# Patient Record
Sex: MALE | Race: AMERICAN INDIAN OR ALASKA NATIVE | NOT HISPANIC OR LATINO | Employment: UNEMPLOYED | URBAN - METROPOLITAN AREA
[De-identification: names, ages, dates, MRNs, and addresses within clinical notes are randomized per-mention and may not be internally consistent; named-entity substitution may affect disease eponyms.]

---

## 2018-07-17 RX ORDER — CLOBETASOL PROPIONATE 0.5 MG/G
CREAM TOPICAL
Refills: 0 | COMMUNITY
Start: 2018-06-11

## 2018-07-17 RX ORDER — SIMVASTATIN 20 MG
20 TABLET ORAL DAILY
Refills: 0 | COMMUNITY
Start: 2018-06-09

## 2018-07-17 RX ORDER — TIMOLOL MALEATE 5 MG/ML
SOLUTION OPHTHALMIC
Refills: 0 | COMMUNITY
Start: 2018-06-21

## 2018-08-10 ENCOUNTER — OFFICE VISIT (OUTPATIENT)
Dept: CARDIOLOGY CLINIC | Facility: CLINIC | Age: 58
End: 2018-08-10
Payer: COMMERCIAL

## 2018-08-10 VITALS
BODY MASS INDEX: 27.22 KG/M2 | SYSTOLIC BLOOD PRESSURE: 126 MMHG | WEIGHT: 183.8 LBS | DIASTOLIC BLOOD PRESSURE: 86 MMHG | HEART RATE: 55 BPM | OXYGEN SATURATION: 100 % | HEIGHT: 69 IN

## 2018-08-10 DIAGNOSIS — E78.5 HYPERLIPIDEMIA, UNSPECIFIED HYPERLIPIDEMIA TYPE: ICD-10-CM

## 2018-08-10 DIAGNOSIS — Z82.49 FAMILY HISTORY OF CORONARY ARTERY DISEASE IN MOTHER: Primary | ICD-10-CM

## 2018-08-10 PROCEDURE — 99202 OFFICE O/P NEW SF 15 MIN: CPT | Performed by: INTERNAL MEDICINE

## 2018-08-10 PROCEDURE — 93000 ELECTROCARDIOGRAM COMPLETE: CPT | Performed by: INTERNAL MEDICINE

## 2018-08-10 NOTE — PROGRESS NOTES
[facility]  [address]  [address]  Cardiology Consultation     Perico Cordero  [id number]  1960      1  Family history of coronary artery disease in mother  POCT ECG   2  Hyperlipidemia, unspecified hyperlipidemia type         Chief Complaint   Patient presents with    [facility]     Family Hx of CAD       HPI:  Patient with history of HLD and FHx of CAD/MI presents to establish care  No prior cardiac history  Denies chest pain, SOB, lightheadedness, palpitations, leg swelling, syncope  Nonsmoker  Last lipid panel showed   Monther passed away from MI  There is no problem list on file for this patient  Past Medical History:   Diagnosis Date    Fatigue     Hyperlipidemia      Social History     Social History    Marital status:      Spouse name: N/A    Number of children: N/A    Years of education: N/A     Occupational History    Not on file  Social History Main Topics    Smoking status: Former Smoker    Smokeless tobacco: Never Used    Alcohol use Yes      Comment: socially    Drug use: Unknown    Sexual activity: Not on file     Other Topics Concern    Not on file     Social History Narrative    No narrative on file      Family History   Problem Relation Age of Onset    Heart attack Mother     Heart disease Father     Stroke Father     No Known Problems Brother      History reviewed  No pertinent surgical history  Current Outpatient Prescriptions:     simvastatin (ZOCOR) 20 mg tablet, Take 20 mg by mouth daily, Disp: , Rfl: 0    timolol (TIMOPTIC-XE) 0 5 % ophthalmic gel-forming, INSTILL 1 DROP INTO BOTH EYES DAILY, Disp: , Rfl: 0    clobetasol (TEMOVATE) 0 05 % cream, , Disp: , Rfl: 0  No Known Allergies  Vitals:    08/10/18 1057   BP: 126/86   Pulse: 55   SpO2: 100%   Weight: 83 4 kg (183 lb 12 8 oz)   Height: 5' 9" (1 753 m)       Labs:  No visits with results within 2 Month(s) from this visit     Latest known visit with results is:   Conversion Encounter Outpatient on 02/08/2014   Component Date Value    Color, UA 02/08/2014 Yellow     Clarity, UA 02/08/2014 Clear     Specific Gravity, UA 02/08/2014 1 020     pH, UA 02/08/2014 7 0     Glucose, UA 02/08/2014 Negative     Ketones, UA 02/08/2014 Negative     Blood, UA 02/08/2014 Negative     Protein, UA 02/08/2014 Negative     Nitrite, UA 02/08/2014 Negative     Bilirubin, UA 02/08/2014 Negative     Urobilinogen, UA 02/08/2014 0 2     Leukocytes, UA 02/08/2014 Negative     Sodium 02/08/2014 140     Potassium 02/08/2014 4 6     Chloride 02/08/2014 105     CO2 02/08/2014 30     Anion Gap 02/08/2014 5     Total Bilirubin 02/08/2014 0 7     Total Protein 02/08/2014 7 2     Alkaline Phosphatase 02/08/2014 82     ALT 02/08/2014 31     AST 02/08/2014 20     Glucose 02/08/2014 113     Albumin 02/08/2014 3 5     BUN 02/08/2014 17     Calcium 02/08/2014 8 8     Creatinine 02/08/2014 0 77     AAGFR 02/08/2014 >60     NAAGFR 02/08/2014 >60     WBC 02/08/2014 6 51     RBC 02/08/2014 4 51     Hemoglobin 02/08/2014 13 6     Hematocrit 02/08/2014 42 5     MCV 02/08/2014 94     MCH 02/08/2014 30 2     MCHC 02/08/2014 32 0     RDW 02/08/2014 12 9     Platelets 77/61/7660 168     MPV 02/08/2014 10 2      Imaging: No results found  Review of Systems:  Review of Systems   Constitutional: Negative for diaphoresis, fatigue and fever  HENT: Negative for congestion, ear discharge, hearing loss, sinus pain and sore throat  Eyes: Negative for pain, redness and visual disturbance  Respiratory: Negative for cough, chest tightness, shortness of breath and wheezing  Cardiovascular: Negative for chest pain, palpitations and leg swelling  Gastrointestinal: Negative for abdominal distention, abdominal pain, constipation, diarrhea, nausea and vomiting  Endocrine: Negative for cold intolerance and heat intolerance     Genitourinary: Negative for difficulty urinating and hematuria  Musculoskeletal: Negative for arthralgias, back pain, gait problem, joint swelling, myalgias, neck pain and neck stiffness  Skin: Negative for color change, pallor, rash and wound  Neurological: Negative for dizziness, syncope, weakness, numbness and headaches  Hematological: Does not bruise/bleed easily  Psychiatric/Behavioral: Negative for agitation, behavioral problems and confusion  The patient is not nervous/anxious  /86   Pulse 55   Ht 5' 9" (1 753 m)   Wt 83 4 kg (183 lb 12 8 oz)   SpO2 100%   BMI 27 14 kg/m²     Physical Exam:  Physical Exam   Constitutional: He is oriented to person, place, and time  He appears well-developed and well-nourished  HENT:   Head: Normocephalic and atraumatic  Eyes: Conjunctivae and EOM are normal  Pupils are equal, round, and reactive to light  Neck: Normal range of motion  Neck supple  Cardiovascular: Normal rate, regular rhythm, normal heart sounds and intact distal pulses  Exam reveals no gallop and no friction rub  No murmur heard  Pulmonary/Chest: Effort normal and breath sounds normal  No respiratory distress  He has no wheezes  He has no rales  He exhibits no tenderness  Abdominal: Soft  Bowel sounds are normal  He exhibits no distension  There is no rebound  Musculoskeletal: Normal range of motion  He exhibits no edema  Neurological: He is alert and oriented to person, place, and time  He has normal reflexes  Skin: Skin is warm and dry  Psychiatric: He has a normal mood and affect  His behavior is normal  Judgment and thought content normal      EKG: NSR    Discussion/Summary:  1  HLD: Last lipid showed borderline TG and LDL  On simvastatin  Urged diet control  2  FHx CAD: Mother passed from MI  Risk factor modification  Continue statin  Healthy diet, exercise      F/u 1 year

## 2018-08-10 NOTE — LETTER
August 10, 2018     Eligio Sukhjinder, [facility] [address]    Patient: Jack Lawrence   YOB: 1960   Date of Visit: 8/10/2018       Dear Dr Chowdary: Thank you for referring Jack Lawrence to me for evaluation  Below are my notes for this consultation  If you have questions, please do not hesitate to call me  I look forward to following your patient along with you  Sincerely,        Shireen Jaquez MD        CC: No Recipients  Keira Meckel  8/10/2018  5:04 PM  Attested  [facility]  [address]  [address]  Cardiology Consultation     Jack Lawrence  [id number]  1960      1  Family history of coronary artery disease in mother  POCT ECG   2  Hyperlipidemia, unspecified hyperlipidemia type         Chief Complaint   Patient presents with    Establish Care     Family Hx of CAD       HPI:  Patient with history of HLD and FHx of CAD/MI presents to establish care  No prior cardiac history  Denies chest pain, SOB, lightheadedness, palpitations, leg swelling, syncope  Nonsmoker  Last lipid panel showed   Monther passed away from MI  There is no problem list on file for this patient  Past Medical History:   Diagnosis Date    Fatigue     Hyperlipidemia      Social History     Social History    Marital status:      Spouse name: N/A    Number of children: N/A    Years of education: N/A     Occupational History    Not on file  Social History Main Topics    Smoking status: Former Smoker    Smokeless tobacco: Never Used    Alcohol use Yes      Comment: socially    Drug use: Unknown    Sexual activity: Not on file     Other Topics Concern    Not on file     Social History Narrative    No narrative on file      Family History   Problem Relation Age of Onset    Heart attack Mother     Heart disease Father     Stroke Father     No Known Problems Brother      History reviewed  No pertinent surgical history  Current Outpatient Prescriptions:     simvastatin (ZOCOR) 20 mg tablet, Take 20 mg by mouth daily, Disp: , Rfl: 0    timolol (TIMOPTIC-XE) 0 5 % ophthalmic gel-forming, INSTILL 1 DROP INTO BOTH EYES DAILY, Disp: , Rfl: 0    clobetasol (TEMOVATE) 0 05 % cream, , Disp: , Rfl: 0  No Known Allergies  Vitals:    08/10/18 1057   BP: 126/86   Pulse: 55   SpO2: 100%   Weight: 83 4 kg (183 lb 12 8 oz)   Height: 5' 9" (1 753 m)       Labs:  No visits with results within 2 Month(s) from this visit  Latest known visit with results is:   Conversion Encounter Outpatient on 02/08/2014   Component Date Value    Color, UA 02/08/2014 Yellow     Clarity, UA 02/08/2014 Clear     Specific Gravity, UA 02/08/2014 1 020     pH, UA 02/08/2014 7 0     Glucose, UA 02/08/2014 Negative     Ketones, UA 02/08/2014 Negative     Blood, UA 02/08/2014 Negative     Protein, UA 02/08/2014 Negative     Nitrite, UA 02/08/2014 Negative     Bilirubin, UA 02/08/2014 Negative     Urobilinogen, UA 02/08/2014 0 2     Leukocytes, UA 02/08/2014 Negative     Sodium 02/08/2014 140     Potassium 02/08/2014 4 6     Chloride 02/08/2014 105     CO2 02/08/2014 30     Anion Gap 02/08/2014 5     Total Bilirubin 02/08/2014 0 7     Total Protein 02/08/2014 7 2     Alkaline Phosphatase 02/08/2014 82     ALT 02/08/2014 31     AST 02/08/2014 20     Glucose 02/08/2014 113     Albumin 02/08/2014 3 5     BUN 02/08/2014 17     Calcium 02/08/2014 8 8     Creatinine 02/08/2014 0 77     AAGFR 02/08/2014 >60     NAAGFR 02/08/2014 >60     WBC 02/08/2014 6 51     RBC 02/08/2014 4 51     Hemoglobin 02/08/2014 13 6     Hematocrit 02/08/2014 42 5     MCV 02/08/2014 94     MCH 02/08/2014 30 2     MCHC 02/08/2014 32 0     RDW 02/08/2014 12 9     Platelets 07/16/7589 168     MPV 02/08/2014 10 2      Imaging: No results found      Review of Systems:  Review of Systems   Constitutional: Negative for diaphoresis, fatigue and fever  HENT: Negative for congestion, ear discharge, hearing loss, sinus pain and sore throat  Eyes: Negative for pain, redness and visual disturbance  Respiratory: Negative for cough, chest tightness, shortness of breath and wheezing  Cardiovascular: Negative for chest pain, palpitations and leg swelling  Gastrointestinal: Negative for abdominal distention, abdominal pain, constipation, diarrhea, nausea and vomiting  Endocrine: Negative for cold intolerance and heat intolerance  Genitourinary: Negative for difficulty urinating and hematuria  Musculoskeletal: Negative for arthralgias, back pain, gait problem, joint swelling, myalgias, neck pain and neck stiffness  Skin: Negative for color change, pallor, rash and wound  Neurological: Negative for dizziness, syncope, weakness, numbness and headaches  Hematological: Does not bruise/bleed easily  Psychiatric/Behavioral: Negative for agitation, behavioral problems and confusion  The patient is not nervous/anxious  /86   Pulse 55   Ht 5' 9" (1 753 m)   Wt 83 4 kg (183 lb 12 8 oz)   SpO2 100%   BMI 27 14 kg/m²      Physical Exam:  Physical Exam   Constitutional: He is oriented to person, place, and time  He appears well-developed and well-nourished  HENT:   Head: Normocephalic and atraumatic  Eyes: Conjunctivae and EOM are normal  Pupils are equal, round, and reactive to light  Neck: Normal range of motion  Neck supple  Cardiovascular: Normal rate, regular rhythm, normal heart sounds and intact distal pulses  Exam reveals no gallop and no friction rub  No murmur heard  Pulmonary/Chest: Effort normal and breath sounds normal  No respiratory distress  He has no wheezes  He has no rales  He exhibits no tenderness  Abdominal: Soft  Bowel sounds are normal  He exhibits no distension  There is no rebound  Musculoskeletal: Normal range of motion  He exhibits no edema     Neurological: He is alert and oriented to person, place, and time  He has normal reflexes  Skin: Skin is warm and dry  Psychiatric: He has a normal mood and affect  His behavior is normal  Judgment and thought content normal      EKG: NSR    Discussion/Summary:  1  HLD: Last lipid showed borderline TG and LDL  On simvastatin  Urged diet control  2  FHx CAD: Mother passed from MI  Risk factor modification  Continue statin  Healthy diet, exercise  F/u 1 year  Attestation signed by Alisha Pedersen MD at 8/10/2018  5:04 PM:  Split/Shared Statement  I saw/examined the patient  I agree with the Advanced Practitioner's note with the following additions/exceptions:  Patient is here for evaluation of family history of coronary disease and hyperlipidemia  Patient on statins  Patient is asymptomatic  EKG is unremarkable  No cardiac workup needed at this time   Follow-up with me in 1 year